# Patient Record
(demographics unavailable — no encounter records)

---

## 2024-11-20 NOTE — ASSESSMENT
[FreeTextEntry1] : The patient is a 65-year-old male who is due for repeat colonoscopy.  The patient will be scheduled at his earliest convenience and once performed, I will distribute a copy of the results.  Eliseo did have issues with reflux in the past which are currently not a problem.  He will follow-up with his ENT doctor regarding his chronic issues with sinuses and postnasal drip.

## 2024-11-20 NOTE — REVIEW OF SYSTEMS
[Recent Weight Loss (___ Lbs)] : recent [unfilled] ~Ulb weight loss [Cough] : cough [Fever] : no fever [Chills] : no chills [Chest Pain] : no chest pain [Palpitations] : no palpitations [Abdominal Pain] : no abdominal pain [Constipation] : no constipation [Diarrhea] : no diarrhea [Heartburn] : no heartburn [Melena (black stool)] : no melena [Bloating (gassiness)] : no bloating [FreeTextEntry4] : Significant postnasal drip

## 2025-01-16 NOTE — PHYSICAL EXAM
[de-identified] : Constitutional: Well-nourished, well-developed, No acute distress Respiratory:  Good respiratory effort, no SOB Psychiatric: Pleasant and normal affect, alert and oriented x3 Skin: Clean dry and intact Musculoskeletal: normal except where as noted in regional exam  Right Elbow: APPEARANCE: no marked deformities, no swelling or malalignment POSITIVE TENDERNESS: + common extensor bundle at the lateral epicondyle NONTENDER: medial epicondyle, posterior capsules, and other areas of the elbow.  ROM: full, mild pain with end range of wrist flexion and forearm pronation,  painless wrist ext / forearm supination.  RESISTIVE TESTING: resisted wrist/long finger extension reproduces pain at the lateral epicondyle. resisted supination also reproduces some pain. painless resisted wrist flexion. painless resisted pronation.  SPECIAL TESTS: stable v/v stress. neg tinel's cubital tunnel  Right shoulder: APPEARANCE: no marked deformities, no swelling or malalignment POSITIVE TENDERNESS: supraspinatus NONTENDER:  infraspinatus, teres minor. biceps. anterior and posterior capsule. AC joint.  ROM: full with mild painful arc past 60 degrees, no scapular winging or dyskinesia present RESISTIVE TESTING: MMT 4+/5 ER and empty can, 5/5 IR. painless 5/5 resisted flex/ext, horizontal abd/add  SPECIAL TESTS: + Marie and Neers, mildly + cross arm adduction, neg Speeds, neg Martínez's, neg Drop Arm, neg Apprehension. neg apley's scratch test

## 2025-01-16 NOTE — DISCUSSION/SUMMARY
[de-identified] : Discussed findings of today's exam and possible causes of patient's pain.  Educated patient on their most probable diagnosis of chronic intermittent right elbow pain with recent atraumatic exacerbation due to likely primary etiology of lateral epicondylitis.  Patient is also having some compensatory right shoulder and upper arm pain due to subacromial impingement.  Reviewed possible courses of treatment, and we collaboratively decided best course of treatment at this time will include conservative management.  Patient started on a course of oral NSAIDs, prescription given for Naprosyn (We discussed all possible side effects of this medication).  Patient will be started on a course of physical therapy to restore normal range of motion and strength as tolerated.  Patient advised to  over-the-counter tennis elbow brace to be worn during the day for support.  He should in particular to be wearing this when participating in pickleball.  If patient has persisting pain may consider cortisone injection at that time.  Follow up as needed.  Patient appreciates and agrees with current plan.  This note was generated using dragon medical dictation software.  A reasonable effort has been made for proofreading its contents, but typos may still remain.  If there are any questions or points of clarification needed please notify my office.

## 2025-01-16 NOTE — HISTORY OF PRESENT ILLNESS
[de-identified] : Patient is here today for evaluation of right elbow pain.  He first had this elbow pain approximately 1 year ago, he took some over-the-counter medications and it went away after a few weeks.  It was fine until about 4 months ago when he started to notice the pain throughout the lateral elbow that radiates down the entire forearm.  This happens primarily after playing pickle ball.  Patient also has some pain that radiates into the upper arm and occasionally into the shoulder.  No numbness/tingling.  No pain in his neck.  Patient has tried a few doses of over-the-counter anti-inflammatory.  Otherwise, no workup or treatment as of yet.